# Patient Record
Sex: MALE | Race: WHITE | Employment: UNEMPLOYED | URBAN - NONMETROPOLITAN AREA
[De-identification: names, ages, dates, MRNs, and addresses within clinical notes are randomized per-mention and may not be internally consistent; named-entity substitution may affect disease eponyms.]

---

## 2023-07-21 ENCOUNTER — HOSPITAL ENCOUNTER (EMERGENCY)
Age: 88
Discharge: HOME | End: 2023-07-21
Attending: STUDENT IN AN ORGANIZED HEALTH CARE EDUCATION/TRAINING PROGRAM
Payer: COMMERCIAL

## 2023-07-21 VITALS
HEART RATE: 58 BPM | DIASTOLIC BLOOD PRESSURE: 62 MMHG | RESPIRATION RATE: 16 BRPM | OXYGEN SATURATION: 94 % | SYSTOLIC BLOOD PRESSURE: 123 MMHG | TEMPERATURE: 97.9 F

## 2023-07-21 DIAGNOSIS — Z78.9 PROBLEM WITH VASCULAR ACCESS: Primary | ICD-10-CM

## 2023-07-21 PROCEDURE — 99282 EMERGENCY DEPT VISIT SF MDM: CPT

## 2023-07-21 ASSESSMENT — ENCOUNTER SYMPTOMS
APNEA: 0
ABDOMINAL DISTENTION: 0
NAUSEA: 0
ABDOMINAL PAIN: 0

## 2023-07-21 ASSESSMENT — PAIN - FUNCTIONAL ASSESSMENT: PAIN_FUNCTIONAL_ASSESSMENT: NONE - DENIES PAIN

## 2023-07-21 NOTE — DISCHARGE INSTRUCTIONS
You came to [facility] for evaluation of a vascular access bleeding port. The bleeding was controlled using a skin glue and redressed. If it bleeds again and is not able to be controlled with the glue that you were given please return the emergency department immediately for reevaluation.

## 2023-07-21 NOTE — ED PROVIDER NOTES
[facility] ED  Emergency Department Encounter  Emergency Medicine Resident     Pt Name:Don Lorenzo  MRN: [id number]  [id number] [facility] 8/30/1934  Date of evaluation: 7/21/23  PCP:  Heriberto Dunham MD  Note Started: 7:31 PM EDT      CHIEF COMPLAINT       Chief Complaint   Patient presents with    Vascular Access Problem     Right chest dialysis catheter with bleeding from insertion site. Patient had 'tubing change' done to catheter today. Full session of dialysis completed. HISTORY OF PRESENT ILLNESS  (Location/Symptom, Timing/Onset, Context/Setting, Quality, Duration, Modifying Factors, Severity.)      Leilani Loco is a 80 y.o. male who presents with history of stage IV kidney disease with a dialysis catheter in his right chest presents emerged apartment after having a bleeding complication around his dialysis port. Patient's daughter is in the room who is his main caregiver stating that they went to dialysis today where the line was exchanged. There was mild bleeding and that he was patched up after he was completed. Daughter noticed that the line continued to bleed despite having been appropriately bandaged and came to the emerge apartment to have it rebandaged and possibly fixed further. On close evaluation, there is some light bleeding, on the underside of the actual catheter site. Dark in color, nonpulsatile, no signs of erythema or infection around the site. Patient states otherwise is completely comfortable has no other active complaints. PAST MEDICAL / SURGICAL / SOCIAL / FAMILY HISTORY      has no past medical history on file. has no past surgical history on file.       Social History     Socioeconomic History    Marital status:      Spouse name: Not on file    Number of children: Not on file    Years of education: Not on file    Highest education level: Not on file   Occupational History    Not on file   Tobacco Use    Smoking status: Not on file    Smokeless tobacco: Not